# Patient Record
Sex: FEMALE | Race: WHITE | Employment: PART TIME | URBAN - METROPOLITAN AREA
[De-identification: names, ages, dates, MRNs, and addresses within clinical notes are randomized per-mention and may not be internally consistent; named-entity substitution may affect disease eponyms.]

---

## 2018-01-23 ENCOUNTER — OFFICE VISIT (OUTPATIENT)
Dept: ORTHOPEDIC SURGERY | Age: 48
End: 2018-01-23

## 2018-01-23 VITALS
SYSTOLIC BLOOD PRESSURE: 106 MMHG | WEIGHT: 149.91 LBS | HEART RATE: 39 BPM | HEIGHT: 64 IN | DIASTOLIC BLOOD PRESSURE: 43 MMHG | BODY MASS INDEX: 25.59 KG/M2

## 2018-01-23 DIAGNOSIS — M54.2 CERVICAL PAIN: Primary | ICD-10-CM

## 2018-01-23 DIAGNOSIS — M79.18 CERVICAL MYOFASCIAL PAIN SYNDROME: ICD-10-CM

## 2018-01-23 DIAGNOSIS — S16.1XXA STRAIN OF NECK MUSCLE, INITIAL ENCOUNTER: ICD-10-CM

## 2018-01-23 PROCEDURE — 99203 OFFICE O/P NEW LOW 30 MIN: CPT | Performed by: PHYSICIAN ASSISTANT

## 2018-01-23 RX ORDER — CYCLOBENZAPRINE HCL 10 MG
TABLET ORAL
Qty: 30 TABLET | Refills: 0 | Status: SHIPPED | OUTPATIENT
Start: 2018-01-23

## 2018-01-23 RX ORDER — MELOXICAM 15 MG/1
TABLET ORAL
Qty: 30 TABLET | Refills: 1 | Status: SHIPPED | OUTPATIENT
Start: 2018-01-23

## 2018-02-01 ENCOUNTER — HOSPITAL ENCOUNTER (OUTPATIENT)
Dept: PHYSICAL THERAPY | Age: 48
Discharge: HOME | End: 2018-02-28
Attending: PHYSICIAN ASSISTANT | Admitting: PHYSICIAN ASSISTANT

## 2018-02-05 ENCOUNTER — HOSPITAL ENCOUNTER (OUTPATIENT)
Dept: PHYSICAL THERAPY | Age: 48
Discharge: HOME | End: 2018-01-31
Admitting: PHYSICIAN ASSISTANT

## 2018-03-27 ENCOUNTER — OFFICE VISIT (OUTPATIENT)
Dept: ORTHOPEDIC SURGERY | Age: 48
End: 2018-03-27

## 2018-03-27 VITALS — BODY MASS INDEX: 25.59 KG/M2 | HEIGHT: 64 IN | WEIGHT: 149.91 LBS

## 2018-03-27 DIAGNOSIS — R52 PAIN: Primary | ICD-10-CM

## 2018-03-27 PROCEDURE — 99213 OFFICE O/P EST LOW 20 MIN: CPT | Performed by: PHYSICIAN ASSISTANT

## 2018-03-27 NOTE — PROGRESS NOTES
She has mild discomfort and mild weakness since test.  Sensation about the lateral aspect of the shoulder in an axillary nerve distribution pattern is intact. Examination of the contralateral shoulder reveals no atrophy or deformity. The skin is warm and dry. Range of motion is within normal limits. There is no focal tenderness with palpation. Provocative SLAP, biceps tension, apprehension AC joint or rotator cuff tests are negative. Strength is graded 5/5 in all muscle groups. The distal neurovascular exam is grossly intact. Cervical spine: The skin is warm and dry. There is no swelling, warmth, or erythema. Range of motion is within normal limits. There is no paraspinal or spinous process tenderness. Spurling's sign is negative and did not produce shoulder pain. The distal neurovascular exam is grossly intact. X-RAYS: 4 views of the left shoulder were obtained in the office and reviewed today. The glenohumeral joint is well maintained. The acromioclavicular joint is well maintained. There is a type II acromion visualized in the outlet view. Lung fields are clear with normal pulmonary markings. ASSESSMENT:  Peritrapezial discomfort likely related to underlying anxiety disorder    PLAN: I had a detailed discussion with Vic Saxena regarding diagnosis and treatment options. I recommended a course of physical therapy for the cervical spine and left shoulder focusing on posture, range of motion, and rotator cuff and periscapular strengthening. I also recommended that she seek treatment for underlying anxiety disorder. I will see her back for reevaluation upon her request.  She is in agreement with this plan.     Patient seen and examined by Rodrigue Huntley PA-C and Dr. Tony Agarwal MD